# Patient Record
Sex: FEMALE | Race: BLACK OR AFRICAN AMERICAN | Employment: FULL TIME | URBAN - METROPOLITAN AREA
[De-identification: names, ages, dates, MRNs, and addresses within clinical notes are randomized per-mention and may not be internally consistent; named-entity substitution may affect disease eponyms.]

---

## 2019-08-12 PROCEDURE — 36415 COLL VENOUS BLD VENIPUNCTURE: CPT | Performed by: INTERNAL MEDICINE

## 2019-08-12 PROCEDURE — 82088 ASSAY OF ALDOSTERONE: CPT | Performed by: INTERNAL MEDICINE

## 2019-08-12 PROCEDURE — 84156 ASSAY OF PROTEIN URINE: CPT | Performed by: INTERNAL MEDICINE

## 2019-08-12 PROCEDURE — 84300 ASSAY OF URINE SODIUM: CPT | Performed by: INTERNAL MEDICINE

## 2019-08-12 PROCEDURE — 82570 ASSAY OF URINE CREATININE: CPT | Performed by: INTERNAL MEDICINE

## 2022-01-09 PROBLEM — E26.09 PRIMARY HYPERALDOSTERONISM (HCC): Status: ACTIVE | Noted: 2022-01-09

## 2024-08-16 ENCOUNTER — HOSPITAL ENCOUNTER (EMERGENCY)
Facility: HOSPITAL | Age: 54
Discharge: HOME | End: 2024-08-16
Attending: EMERGENCY MEDICINE
Payer: COMMERCIAL

## 2024-08-16 ENCOUNTER — APPOINTMENT (OUTPATIENT)
Dept: GENERAL RADIOLOGY | Facility: HOSPITAL | Age: 54
End: 2024-08-16
Attending: EMERGENCY MEDICINE
Payer: COMMERCIAL

## 2024-08-16 VITALS
HEART RATE: 68 BPM | DIASTOLIC BLOOD PRESSURE: 95 MMHG | SYSTOLIC BLOOD PRESSURE: 140 MMHG | TEMPERATURE: 98 F | BODY MASS INDEX: 28.07 KG/M2 | WEIGHT: 143 LBS | HEIGHT: 60 IN | RESPIRATION RATE: 16 BRPM | OXYGEN SATURATION: 100 %

## 2024-08-16 DIAGNOSIS — M54.12 CERVICAL RADICULOPATHY: Primary | ICD-10-CM

## 2024-08-16 PROCEDURE — 72050 X-RAY EXAM NECK SPINE 4/5VWS: CPT | Performed by: EMERGENCY MEDICINE

## 2024-08-16 PROCEDURE — 99283 EMERGENCY DEPT VISIT LOW MDM: CPT

## 2024-08-16 PROCEDURE — 99284 EMERGENCY DEPT VISIT MOD MDM: CPT

## 2024-08-16 RX ORDER — CYCLOBENZAPRINE HCL 10 MG
10 TABLET ORAL 3 TIMES DAILY PRN
Qty: 20 TABLET | Refills: 0 | Status: SHIPPED | OUTPATIENT
Start: 2024-08-16 | End: 2024-08-23

## 2024-08-16 RX ORDER — METHYLPREDNISOLONE 4 MG
TABLET, DOSE PACK ORAL
Qty: 1 EACH | Refills: 0 | Status: SHIPPED | OUTPATIENT
Start: 2024-08-16

## 2024-08-16 NOTE — ED QUICK NOTES
Patient has left arm pain that shoots from her shoulder down to there finger tips. Worse with moving head side to side. Has been ongoing X weeks. Has tried Salonpas without relief.

## 2024-08-16 NOTE — ED INITIAL ASSESSMENT (HPI)
PT PRESENTS TO ED WITH LEFT ARM \"NERVE PAIN\" FOR 5-6 WEEKS, STATES SHE FEELS SHOOTING PAIN STARTING IN SHOULDER AND RADIATES TO FINGERS, STATES IT IS INTERMITTENT AND SYMPTOMS HAVE NOT RESOLVED.  STATES SHE FEELS LIKE HER BLOOD VESSELS POP WHEN SYMPTOMS START.  PT DENIES CP, DENIES SHORTNESS OF BREATH.

## 2024-08-17 NOTE — DISCHARGE INSTRUCTIONS
Neurologic Instructions    Call your doctor if you have:  increased pain or headache.   trouble seeing, walking or using your arms or legs.   dizziness or passing out.   any change in behavior (agitated or sleepy).   trouble being awakened from sleep.   numbness in your face, arm or leg.   extreme weakness.   trouble talking.   nausea and vomiting.   any new or severe symptoms.    Take your medicines as prescribed. Most important, see a doctor again as discussed. If you have problems that we have not discussed, call or visit your doctor right away. If you cannot reach your doctor, return to the Emergency Department.

## 2024-08-17 NOTE — ED PROVIDER NOTES
Patient Seen in: [facility] Emergency Department      History     Chief Complaint   Patient presents with    Pain     Stated Complaint: \"nerve pain\" in L arm for weeks    Subjective:   HPI    53-year-old female presents for evaluation of nerve pain in her left arm.  Patient describes a sharp pain from her left posterior neck which radiates down her arm and causes numbness and tingling specifically to her forearm and the top of her left hand.  Symptoms seem to be intermittent and worse with movement of the head and neck.  Denies any weakness.  No chest pain or shortness of breath.  No recent history of trauma.    Objective:   Past Medical History:    Bell's palsy    CKD (chronic kidney disease)    HYPERTENSION    Hypokalemia    Primary hyperaldosteronism (HCC)              Past Surgical History:   Procedure Laterality Date    Endometrial ablation      Hysterectomy      ovaries intact    Tubal ligation      essure                Social History     Socioeconomic History    Marital status:    Tobacco Use    Smoking status: Never    Smokeless tobacco: Never   Substance and Sexual Activity    Alcohol use: No    Drug use: No   Social History Narrative         2 children, 2 step children    Employment adult education counselor                                  Review of Systems    Positive for stated Chief Complaint: Pain    Other systems are as noted in HPI.  Constitutional and vital signs reviewed.      All other systems reviewed and negative except as noted above.    Physical Exam     ED Triage Vitals [08/16/24 1627]   /87   Pulse 89   Resp 16   Temp 98.1 °F (36.7 °C)   Temp src Temporal   SpO2 100 %   O2 Device None (Room air)       Current Vitals:   Vital Signs  BP: (!) 140/95  Pulse: 68  Resp: 16  Temp: 98.1 °F (36.7 °C)  Temp src: Temporal  MAP (mmHg): (!) 107    Oxygen Therapy  SpO2: 100 %  O2 Device: None (Room air)            Physical Exam    General: Alert, oriented, no apparent  distress  HEENT:  Pupils equal reactive.  Extraocular motions intact. MMM.  Neck: Supple  Lungs: Clear to auscultation bilaterally.  Heart: Regular rate and rhythm.  Strong radial pulses  Abdomen: Soft, nontender.   Skin: No rash.  No edema.  Neurologic: No focal neurologic deficits.  Normal speech pattern.  Median, radial and ulnar nerve intact to sensory and motor  Musculoskeletal: No tenderness or deformity noted.  Full range of motion throughout.        ED Course   Labs Reviewed - No data to display                   MDM      Differential diagnosis includes cervical radiculopathy, neck muscle spasm, peripheral vascular disease.      Patient is neurovascularly intact to the left upper extremity.  Symptoms by history seem most consistent with radiculopathy    XR CERVICAL SPINE (4VIEWS) (CPT=72050)    Result Date: 8/16/2024  PROCEDURE:  XR CERVICAL SPINE (4VIEWS) (CPT=72050)  TECHNIQUE:  AP, lateral, obliques, and coned down view of the spine were obtained.  COMPARISON:  None.  INDICATIONS:  nerve pain in L arm for weeks  PATIENT STATED HISTORY: (As transcribed by Technologist)  Patient has left arm pain for 4 weeks and tingling down the left arm to her hand for 2 weeks. No injury.    FINDINGS:    BONES:  There is moderate disc space narrowing at C4-C5 and C5-C6 with anterior osteophytes.  There is reversal of normal cervical lordosis. DISC SPACES:  Disc space narrowing is noted above.  Left uncovertebral joint osteophyte at C5-C6 causes mild encroachment on left foramen. PARASPINOUS:  Negative.  No paraspinous abnormality is seen. OTHER:  Negative.              CONCLUSION:  There are degenerative changes in the cervical spine described above.  There is no acute abnormality.   LOCATION:     Dictated by (CST): Wyatt Chase MD on 8/16/2024 at 7:57 PM     Finalized by (CST): Wyatt Chase MD on 8/16/2024 at 7:58 PM            X-ray as above shows some osteophytes encroaching on the left C5-C6  foramina.    Patient will be prescribed Medrol and Flexeril.  Follow-up with PCP.  Call on Monday for appointment.  If symptoms worsen or change over the weekend, return here                   Medical Decision Making      Disposition and Plan     Clinical Impression:  1. Cervical radiculopathy         Disposition:  Discharge  8/16/2024  8:55 pm    Follow-up:  [facility], [address] [place] [address] [place] [id number] [phone]  Call  choose option 2 for neurosurgery or pain follow up          Medications Prescribed:  Current Discharge Medication List        START taking these medications    Details   methylPREDNISolone (MEDROL) 4 MG Oral Tablet Therapy Pack Dosepack: take as directed  Qty: 1 each, Refills: 0      cyclobenzaprine 10 MG Oral Tab Take 1 tablet (10 mg total) by mouth 3 (three) times daily as needed for Muscle spasms.  Qty: 20 tablet, Refills: 0